# Patient Record
Sex: FEMALE | Race: WHITE | NOT HISPANIC OR LATINO | Employment: STUDENT | URBAN - METROPOLITAN AREA
[De-identification: names, ages, dates, MRNs, and addresses within clinical notes are randomized per-mention and may not be internally consistent; named-entity substitution may affect disease eponyms.]

---

## 2023-05-04 PROBLEM — R51.9 HEADACHE: Status: ACTIVE | Noted: 2023-05-04

## 2023-05-04 PROBLEM — F41.9 ANXIETY: Status: ACTIVE | Noted: 2023-05-04

## 2023-05-04 PROBLEM — J30.2 SEASONAL ALLERGIES: Status: ACTIVE | Noted: 2023-05-04

## 2023-05-04 RX ORDER — NAPROXEN 500 MG/1
500 TABLET ORAL EVERY 12 HOURS
COMMUNITY
Start: 2022-11-11 | End: 2023-05-05 | Stop reason: ALTCHOICE

## 2023-05-04 RX ORDER — DIPHENHYDRAMINE HCL 25 MG
TABLET ORAL DAILY PRN
COMMUNITY
Start: 2022-11-11 | End: 2023-05-05 | Stop reason: ALTCHOICE

## 2023-05-04 RX ORDER — METOCLOPRAMIDE 10 MG/1
TABLET ORAL DAILY PRN
COMMUNITY
Start: 2022-11-11 | End: 2023-05-05 | Stop reason: ALTCHOICE

## 2023-05-04 RX ORDER — BUSPIRONE HYDROCHLORIDE 5 MG/1
1 TABLET ORAL 2 TIMES DAILY
COMMUNITY
Start: 2022-11-23 | End: 2023-05-05 | Stop reason: ALTCHOICE

## 2023-05-05 ENCOUNTER — OFFICE VISIT (OUTPATIENT)
Dept: PRIMARY CARE | Facility: CLINIC | Age: 43
End: 2023-05-05
Payer: MEDICAID

## 2023-05-05 VITALS
DIASTOLIC BLOOD PRESSURE: 60 MMHG | HEART RATE: 80 BPM | SYSTOLIC BLOOD PRESSURE: 110 MMHG | BODY MASS INDEX: 22.19 KG/M2 | HEIGHT: 67 IN | WEIGHT: 141.4 LBS

## 2023-05-05 DIAGNOSIS — R10.2 PELVIC PAIN: ICD-10-CM

## 2023-05-05 DIAGNOSIS — R30.0 DYSURIA: Primary | ICD-10-CM

## 2023-05-05 LAB
APPEARANCE, URINE: CLEAR
BILIRUBIN, URINE: NEGATIVE
BLOOD, URINE: NEGATIVE
COLOR, URINE: COLORLESS
GLUCOSE, URINE: NEGATIVE MG/DL
KETONES, URINE: NEGATIVE MG/DL
LEUKOCYTE ESTERASE, URINE: NEGATIVE
NITRITE, URINE: NEGATIVE
PH, URINE: 7 (ref 5–8)
POC APPEARANCE, URINE: CLEAR
POC BILIRUBIN, URINE: NEGATIVE
POC BLOOD, URINE: ABNORMAL
POC COLOR, URINE: ABNORMAL
POC GLUCOSE, URINE: NEGATIVE MG/DL
POC KETONES, URINE: NEGATIVE MG/DL
POC LEUKOCYTES, URINE: NEGATIVE
POC NITRITE,URINE: NEGATIVE
POC PH, URINE: 6 PH
POC PROTEIN, URINE: NEGATIVE MG/DL
POC SPECIFIC GRAVITY, URINE: 1.01
POC UROBILINOGEN, URINE: 0.2 EU/DL
PROTEIN, URINE: NEGATIVE MG/DL
SPECIFIC GRAVITY, URINE: 1 (ref 1–1.03)
UROBILINOGEN, URINE: <2 MG/DL (ref 0–1.9)

## 2023-05-05 PROCEDURE — 99213 OFFICE O/P EST LOW 20 MIN: CPT | Performed by: STUDENT IN AN ORGANIZED HEALTH CARE EDUCATION/TRAINING PROGRAM

## 2023-05-05 PROCEDURE — 81003 URINALYSIS AUTO W/O SCOPE: CPT | Performed by: STUDENT IN AN ORGANIZED HEALTH CARE EDUCATION/TRAINING PROGRAM

## 2023-05-05 PROCEDURE — 81003 URINALYSIS AUTO W/O SCOPE: CPT

## 2023-05-05 PROCEDURE — 1036F TOBACCO NON-USER: CPT | Performed by: STUDENT IN AN ORGANIZED HEALTH CARE EDUCATION/TRAINING PROGRAM

## 2023-05-05 ASSESSMENT — ENCOUNTER SYMPTOMS
FEVER: 0
DYSPHORIC MOOD: 0
COUGH: 0
FREQUENCY: 1
PALPITATIONS: 0
SHORTNESS OF BREATH: 0
DYSURIA: 1
NERVOUS/ANXIOUS: 0
CHILLS: 0

## 2023-05-05 NOTE — PROGRESS NOTES
"Discuss urinary incontinence.    Subjective   Patient ID: Mandy Ku is a 42 y.o. female who presents for Urinary Incontinence.    HPI  State that she was jumping on a trampoline with her son earlier this week and was incontinent of urine. Felt a little off the following day, so started supplementing with cranberry juice in case she had a UTI. She does note a little urinary leakage with laughing on retrospect. Had 2 full term deliveries via c section. Does yoga regularly and has been trying to implement kegel exercises. Since last delivery multiple years ago, has had intermittent pelvic pain R>L. Denies abd pain, fever, flank pain. Has had mild burning with urination and frequency.    Review of Systems   Constitutional:  Negative for chills and fever.   Respiratory:  Negative for cough and shortness of breath.    Cardiovascular:  Negative for chest pain and palpitations.   Genitourinary:  Positive for dysuria and frequency.   Skin:  Negative for rash.   Psychiatric/Behavioral:  Negative for dysphoric mood. The patient is not nervous/anxious.      Objective   /60   Pulse 80   Ht 1.702 m (5' 7\")   Wt 64.1 kg (141 lb 6.4 oz)   BMI 22.15 kg/m²     Physical Exam  Constitutional:       Appearance: Normal appearance.   HENT:      Head: Normocephalic and atraumatic.   Eyes:      General: No scleral icterus.     Conjunctiva/sclera: Conjunctivae normal.   Cardiovascular:      Rate and Rhythm: Normal rate.   Pulmonary:      Effort: Pulmonary effort is normal. No respiratory distress.   Abdominal:      General: Abdomen is flat. There is no distension.      Palpations: Abdomen is soft. There is no mass.      Tenderness: There is abdominal tenderness (mild suprapubic). There is no guarding.   Musculoskeletal:      Cervical back: Normal range of motion and neck supple.   Skin:     General: Skin is warm and dry.      Findings: No rash.   Neurological:      General: No focal deficit present.      Mental Status: She " is alert.   Psychiatric:         Mood and Affect: Mood normal.         Behavior: Behavior normal.       Assessment/Plan   Problem List Items Addressed This Visit       Pelvic pain     R-sided. Intermittent for multiple years. Reviewed usual workup. Will start with TVUS and will follow up with results. Return precautions reviewed.         Relevant Orders    US pelvis transvaginal    Dysuria - Primary     Urinary frequency, mild burning, incontinence x1 over past few days. UA in office with trace blood - will send for micro and culture and follow up with results. Did discuss pelvic floor and likely relation to her current symptoms - she is agreeable to working on kegel exercises. Will continue to monitor. Return precautions reviewed.         Relevant Orders    POCT UA Automated manually resulted (Completed)    Urinalysis with Reflex Microscopic    Urine Culture

## 2023-05-05 NOTE — ASSESSMENT & PLAN NOTE
Urinary frequency, mild burning, incontinence x1 over past few days. UA in office with trace blood - will send for micro and culture and follow up with results. Did discuss pelvic floor and likely relation to her current symptoms - she is agreeable to working on kegel exercises. Will continue to monitor. Return precautions reviewed.

## 2023-05-05 NOTE — ASSESSMENT & PLAN NOTE
R-sided. Intermittent for multiple years. Reviewed usual workup. Will start with TVUS and will follow up with results. Return precautions reviewed.

## 2023-07-21 ENCOUNTER — TELEPHONE (OUTPATIENT)
Dept: PRIMARY CARE | Facility: CLINIC | Age: 43
End: 2023-07-21

## 2023-07-21 NOTE — TELEPHONE ENCOUNTER
Mandy needs a new order placed for her pelvic ultrasound it was cancelled in the system back in May.    She has decided to have it done now

## 2023-07-25 DIAGNOSIS — R10.2 PELVIC PAIN: Primary | ICD-10-CM

## 2023-09-06 ENCOUNTER — OFFICE VISIT (OUTPATIENT)
Dept: PRIMARY CARE | Facility: CLINIC | Age: 43
End: 2023-09-06
Payer: MEDICAID

## 2023-09-06 VITALS
WEIGHT: 136.8 LBS | HEART RATE: 80 BPM | SYSTOLIC BLOOD PRESSURE: 110 MMHG | HEIGHT: 67 IN | BODY MASS INDEX: 21.47 KG/M2 | DIASTOLIC BLOOD PRESSURE: 76 MMHG

## 2023-09-06 DIAGNOSIS — Z00.00 ROUTINE GENERAL MEDICAL EXAMINATION AT A HEALTH CARE FACILITY: Primary | ICD-10-CM

## 2023-09-06 DIAGNOSIS — R10.2 PELVIC PAIN: ICD-10-CM

## 2023-09-06 PROCEDURE — 99213 OFFICE O/P EST LOW 20 MIN: CPT | Performed by: STUDENT IN AN ORGANIZED HEALTH CARE EDUCATION/TRAINING PROGRAM

## 2023-09-06 PROCEDURE — 1036F TOBACCO NON-USER: CPT | Performed by: STUDENT IN AN ORGANIZED HEALTH CARE EDUCATION/TRAINING PROGRAM

## 2023-09-06 NOTE — PROGRESS NOTES
"Subjective   Patient ID: Mandy Ku is a 43 y.o. female who presents for physical exam    HPI  Pelvic pain - intermittent, worse with periods, does have appt with obgyn (Ann Marie Painting NP) next week to discuss    Otherwise doing well, still in school, working on exercise regimen, trying to cook more at home    Cervical Cancer Screening: thinks pap utd with obgyn  Breast Cancer Screening: no fhx, plan to get with obgyn  Osteoporosis Screening: plan to start at age 65  Colon Cancer Screening: no fhx, asymptomatic, plan to start at age 45  Tobacco: denies  Alcohol: denies  Recreational Drugs: denies  Immunizations: declines     Review of Systems   Constitutional:  Negative for chills and fever.   Respiratory:  Negative for cough and shortness of breath.    Cardiovascular:  Negative for chest pain and palpitations.   Skin:  Negative for rash.   Psychiatric/Behavioral:  Negative for dysphoric mood. The patient is not nervous/anxious.      Objective   /76   Pulse 80   Ht 1.702 m (5' 7\")   Wt 62.1 kg (136 lb 12.8 oz)   BMI 21.43 kg/m²     Physical Exam  Vitals reviewed.   Constitutional:       General: She is not in acute distress.     Appearance: Normal appearance.   HENT:      Head: Normocephalic and atraumatic.   Eyes:      General: No scleral icterus.     Conjunctiva/sclera: Conjunctivae normal.   Cardiovascular:      Rate and Rhythm: Normal rate and regular rhythm.      Heart sounds: No murmur heard.  Pulmonary:      Effort: Pulmonary effort is normal. No respiratory distress.      Breath sounds: Normal breath sounds.   Abdominal:      General: Bowel sounds are normal. There is no distension.      Palpations: Abdomen is soft.      Tenderness: There is no abdominal tenderness. There is no guarding or rebound.   Musculoskeletal:         General: No swelling or deformity.      Cervical back: Normal range of motion and neck supple.   Skin:     General: Skin is warm and dry.      Findings: No rash. "   Neurological:      General: No focal deficit present.      Mental Status: She is alert.   Psychiatric:         Mood and Affect: Mood normal.         Behavior: Behavior normal.       Assessment/Plan   Problem List Items Addressed This Visit       Pelvic pain     Intermittent. Reviewed tvus - slightly heterogenous myometrium. Has appt with obgyn next week to discuss tx options.          Other Visit Diagnoses       Routine general medical examination at a health care facility    -  Primary    Relevant Orders    CBC and Auto Differential    Comprehensive Metabolic Panel    Lipid Panel             Will obtain screening labs and will follow up with results. Encouraged breast ca screening with obgyn, which she plans to do. Follow up in 6mo for recheck, sooner if needed.

## 2023-09-07 ASSESSMENT — ENCOUNTER SYMPTOMS
FEVER: 0
CHILLS: 0
SHORTNESS OF BREATH: 0
DYSPHORIC MOOD: 0
NERVOUS/ANXIOUS: 0
COUGH: 0
PALPITATIONS: 0

## 2023-09-07 NOTE — ASSESSMENT & PLAN NOTE
Intermittent. Reviewed tvus - slightly heterogenous myometrium. Has appt with obgyn next week to discuss tx options.

## 2023-09-21 ENCOUNTER — LAB (OUTPATIENT)
Dept: LAB | Facility: LAB | Age: 43
End: 2023-09-21
Payer: MEDICAID

## 2023-09-21 ENCOUNTER — TELEPHONE (OUTPATIENT)
Dept: PRIMARY CARE | Facility: CLINIC | Age: 43
End: 2023-09-21

## 2023-09-21 DIAGNOSIS — Z00.00 ROUTINE GENERAL MEDICAL EXAMINATION AT A HEALTH CARE FACILITY: ICD-10-CM

## 2023-09-21 LAB
ALANINE AMINOTRANSFERASE (SGPT) (U/L) IN SER/PLAS: 13 U/L (ref 7–45)
ALBUMIN (G/DL) IN SER/PLAS: 4.5 G/DL (ref 3.4–5)
ALKALINE PHOSPHATASE (U/L) IN SER/PLAS: 62 U/L (ref 33–110)
ANION GAP IN SER/PLAS: 10 MMOL/L (ref 10–20)
ASPARTATE AMINOTRANSFERASE (SGOT) (U/L) IN SER/PLAS: 15 U/L (ref 9–39)
BASOPHILS (10*3/UL) IN BLOOD BY AUTOMATED COUNT: 0.05 X10E9/L (ref 0–0.1)
BASOPHILS/100 LEUKOCYTES IN BLOOD BY AUTOMATED COUNT: 1 % (ref 0–2)
BILIRUBIN TOTAL (MG/DL) IN SER/PLAS: 0.8 MG/DL (ref 0–1.2)
CALCIUM (MG/DL) IN SER/PLAS: 9.2 MG/DL (ref 8.6–10.3)
CARBON DIOXIDE, TOTAL (MMOL/L) IN SER/PLAS: 27 MMOL/L (ref 21–32)
CHLORIDE (MMOL/L) IN SER/PLAS: 105 MMOL/L (ref 98–107)
CHOLESTEROL (MG/DL) IN SER/PLAS: 145 MG/DL (ref 0–199)
CHOLESTEROL IN HDL (MG/DL) IN SER/PLAS: 52 MG/DL
CHOLESTEROL/HDL RATIO: 2.8
CREATININE (MG/DL) IN SER/PLAS: 0.78 MG/DL (ref 0.5–1.05)
EOSINOPHILS (10*3/UL) IN BLOOD BY AUTOMATED COUNT: 0.15 X10E9/L (ref 0–0.7)
EOSINOPHILS/100 LEUKOCYTES IN BLOOD BY AUTOMATED COUNT: 3 % (ref 0–6)
ERYTHROCYTE DISTRIBUTION WIDTH (RATIO) BY AUTOMATED COUNT: 12.4 % (ref 11.5–14.5)
ERYTHROCYTE MEAN CORPUSCULAR HEMOGLOBIN CONCENTRATION (G/DL) BY AUTOMATED: 32.5 G/DL (ref 32–36)
ERYTHROCYTE MEAN CORPUSCULAR VOLUME (FL) BY AUTOMATED COUNT: 95 FL (ref 80–100)
ERYTHROCYTES (10*6/UL) IN BLOOD BY AUTOMATED COUNT: 4.13 X10E12/L (ref 4–5.2)
GFR FEMALE: >90 ML/MIN/1.73M2
GLUCOSE (MG/DL) IN SER/PLAS: 96 MG/DL (ref 74–99)
HEMATOCRIT (%) IN BLOOD BY AUTOMATED COUNT: 39.1 % (ref 36–46)
HEMOGLOBIN (G/DL) IN BLOOD: 12.7 G/DL (ref 12–16)
IMMATURE GRANULOCYTES/100 LEUKOCYTES IN BLOOD BY AUTOMATED COUNT: 0 % (ref 0–0.9)
LDL: 70 MG/DL (ref 0–99)
LEUKOCYTES (10*3/UL) IN BLOOD BY AUTOMATED COUNT: 5 X10E9/L (ref 4.4–11.3)
LYMPHOCYTES (10*3/UL) IN BLOOD BY AUTOMATED COUNT: 1.86 X10E9/L (ref 1.2–4.8)
LYMPHOCYTES/100 LEUKOCYTES IN BLOOD BY AUTOMATED COUNT: 37.1 % (ref 13–44)
MONOCYTES (10*3/UL) IN BLOOD BY AUTOMATED COUNT: 0.31 X10E9/L (ref 0.1–1)
MONOCYTES/100 LEUKOCYTES IN BLOOD BY AUTOMATED COUNT: 6.2 % (ref 2–10)
NEUTROPHILS (10*3/UL) IN BLOOD BY AUTOMATED COUNT: 2.64 X10E9/L (ref 1.2–7.7)
NEUTROPHILS/100 LEUKOCYTES IN BLOOD BY AUTOMATED COUNT: 52.7 % (ref 40–80)
PLATELETS (10*3/UL) IN BLOOD AUTOMATED COUNT: 225 X10E9/L (ref 150–450)
POTASSIUM (MMOL/L) IN SER/PLAS: 4.1 MMOL/L (ref 3.5–5.3)
PROTEIN TOTAL: 7.1 G/DL (ref 6.4–8.2)
SODIUM (MMOL/L) IN SER/PLAS: 138 MMOL/L (ref 136–145)
TRIGLYCERIDE (MG/DL) IN SER/PLAS: 113 MG/DL (ref 0–149)
UREA NITROGEN (MG/DL) IN SER/PLAS: 7 MG/DL (ref 6–23)
VLDL: 23 MG/DL (ref 0–40)

## 2023-09-21 PROCEDURE — 80053 COMPREHEN METABOLIC PANEL: CPT

## 2023-09-21 PROCEDURE — 80061 LIPID PANEL: CPT

## 2023-09-21 PROCEDURE — 36415 COLL VENOUS BLD VENIPUNCTURE: CPT

## 2023-09-21 PROCEDURE — 85025 COMPLETE CBC W/AUTO DIFF WBC: CPT

## 2023-09-21 NOTE — TELEPHONE ENCOUNTER
She called and said she has been getting more frequent migraines. Wondering if they could be due to allergies? Asking about allergy testing.

## 2023-09-22 ENCOUNTER — TELEPHONE (OUTPATIENT)
Dept: PRIMARY CARE | Facility: CLINIC | Age: 43
End: 2023-09-22
Payer: MEDICAID

## 2023-09-22 DIAGNOSIS — J30.2 SEASONAL ALLERGIES: Primary | ICD-10-CM

## 2023-09-22 NOTE — TELEPHONE ENCOUNTER
Chelsea Aguilar,   [address] [place] [id number] Clinical Support Staff  Please let patient know that all of her labs (cholesterol, glucose, electrolytes, kidneys, liver, blood counts) look great and are in normal range. Nothing to follow up on. Thank you

## 2024-10-18 ENCOUNTER — OFFICE VISIT (OUTPATIENT)
Dept: URGENT CARE | Facility: CLINIC | Age: 44
End: 2024-10-18
Payer: MEDICAID

## 2024-10-18 VITALS
HEART RATE: 87 BPM | RESPIRATION RATE: 16 BRPM | OXYGEN SATURATION: 99 % | WEIGHT: 135 LBS | SYSTOLIC BLOOD PRESSURE: 104 MMHG | BODY MASS INDEX: 21.19 KG/M2 | DIASTOLIC BLOOD PRESSURE: 72 MMHG | HEIGHT: 67 IN

## 2024-10-18 DIAGNOSIS — N30.01 ACUTE CYSTITIS WITH HEMATURIA: Primary | ICD-10-CM

## 2024-10-18 DIAGNOSIS — R30.0 DYSURIA: ICD-10-CM

## 2024-10-18 LAB
POC APPEARANCE, URINE: ABNORMAL
POC BILIRUBIN, URINE: NEGATIVE
POC BLOOD, URINE: ABNORMAL
POC COLOR, URINE: YELLOW
POC GLUCOSE, URINE: NEGATIVE MG/DL
POC KETONES, URINE: NEGATIVE MG/DL
POC LEUKOCYTES, URINE: ABNORMAL
POC NITRITE,URINE: NEGATIVE
POC PH, URINE: 6 PH
POC PROTEIN, URINE: ABNORMAL MG/DL
POC SPECIFIC GRAVITY, URINE: 1.02
POC UROBILINOGEN, URINE: 0.2 EU/DL

## 2024-10-18 PROCEDURE — 81002 URINALYSIS NONAUTO W/O SCOPE: CPT | Performed by: PHYSICIAN ASSISTANT

## 2024-10-18 PROCEDURE — 87086 URINE CULTURE/COLONY COUNT: CPT

## 2024-10-18 PROCEDURE — 99212 OFFICE O/P EST SF 10 MIN: CPT | Performed by: PHYSICIAN ASSISTANT

## 2024-10-18 RX ORDER — SULFAMETHOXAZOLE AND TRIMETHOPRIM 800; 160 MG/1; MG/1
1 TABLET ORAL 2 TIMES DAILY
Qty: 14 TABLET | Refills: 0 | Status: SHIPPED | OUTPATIENT
Start: 2024-10-18 | End: 2024-10-25

## 2024-10-18 NOTE — PROGRESS NOTES
[facility] URGENT CARE   REGAN NOTE:      Name: Mandy Ku, 44 y.o.    CSN:[id number]   MRN:[id number]    PCP: Chelsea Aguilar, DO    ALL:  No Known Allergies    History:    Chief Complaint: UTI (Discomfort while urinating sx for 4 days. )    Encounter Date: 10/18/2024      HPI: The history was obtained from the patient and her two sons . Mandy is a 44 y.o. female, who presents with a chief complaint of UTI (Discomfort while urinating sx for 4 days. ) denies any flank pain, N/V or fever.  Has a history of a  states the second 1 she feels often causes some aching and pain especially when palpating the region and now that she is got the bladder concerns it is worse.    PMHx:    No past medical history on file.           Current Outpatient Medications   Medication Sig Dispense Refill    sulfamethoxazole-trimethoprim (Bactrim DS) 800-160 mg tablet Take 1 tablet by mouth 2 times a day for 7 days. 14 tablet 0     No current facility-administered medications for this visit.         PMSx:    Past Surgical History:   Procedure Laterality Date    OTHER SURGICAL HISTORY  2022     section       Fam Hx:   Family History   Problem Relation Name Age of Onset    Hypertension Mother      Diabetes Mother      Heart attack Mother         SOC. Hx:     Social History     Socioeconomic History    Marital status: Single     Spouse name: Not on file    Number of children: Not on file    Years of education: Not on file    Highest education level: Not on file   Occupational History    Not on file   Tobacco Use    Smoking status: Never    Smokeless tobacco: Never   Substance and Sexual Activity    Alcohol use: Not on file    Drug use: Not on file    Sexual activity: Not on file   Other Topics Concern    Not on file   Social History Narrative    Not on file     Social Drivers of Health     Financial Resource Strain: Not on file   Food Insecurity: Not on file   Transportation Needs: Not on  file   Physical Activity: Not on file   Stress: Not on file   Social Connections: Not on file   Intimate Partner Violence: Not on file   Housing Stability: Not on file         Vitals:    10/18/24 1016   BP: 104/72   Pulse: 87   Resp: 16   SpO2: 99%     61.2 kg (135 lb)          Physical Exam  Vitals reviewed.   Constitutional:       Appearance: She is normal weight.   HENT:      Head: Normocephalic and atraumatic.   Eyes:      Extraocular Movements: Extraocular movements intact.      Pupils: Pupils are equal, round, and reactive to light.   Pulmonary:      Effort: Pulmonary effort is normal.   Abdominal:      General: Abdomen is flat.      Tenderness: There is abdominal tenderness (Suprapubic). There is no right CVA tenderness or left CVA tenderness.   Musculoskeletal:         General: Normal range of motion.   Skin:     General: Skin is warm.      Capillary Refill: Capillary refill takes less than 2 seconds.   Neurological:      General: No focal deficit present.      Mental Status: She is alert.   Psychiatric:         Mood and Affect: Mood normal.         LABORATORY @ RADIOLOGICAL IMAGING (if done):     Results for orders placed or performed in visit on 10/18/24 (from the past 24 hours)   POCT UA (nonautomated w/o microscopy) manually resulted   Result Value Ref Range    POC Color, Urine Yellow Straw, Yellow, Light-Yellow    POC Appearance, Urine Cloudy (A) Clear    POC Glucose, Urine NEGATIVE NEGATIVE mg/dl    POC Bilirubin, Urine NEGATIVE NEGATIVE    POC Ketones, Urine NEGATIVE NEGATIVE mg/dl    POC Specific Gravity, Urine 1.020 1.005 - 1.035    POC Blood, Urine LARGE (3+) (A) NEGATIVE    POC PH, Urine 6.0 No Reference Range Established PH    POC Protein, Urine 100 (2+) (A) NEGATIVE, 30 (1+) mg/dl    POC Urobilinogen, Urine 0.2 0.2, 1.0 EU/DL    Poc Nitrite, Urine NEGATIVE NEGATIVE    POC Leukocytes, Urine LARGE (3+) (A) NEGATIVE       ____________________________________________________________________    I did  personally review Mandy's past medical history, surgical history, social history, as well as family history (when relevant).  In this case, I also oversaw the her drug management by reviewing her medication list, allergy list, as well as the medications that I prescribed during the UC course and/or recommended as an out-patient (including possible OTC medications such as acetaminophen, NSAIDs , etc).    After reviewing the items above, I did look at previous medical documentation, such as recent hospitalizations, office visits, and/or recent consultations with PCP/specialist.                          SDOH:   Another factor that I considered in Mandy's care was her Social Determinants of Health (SDOH). During this UC encounter, she did not have social determinants of health. Those SDOH influencing Mandy's care are: none      _____________________________________________________________________      UC COURSE/MEDICAL DECISION MAKING:    Mandy is a 44 y.o., who presents with a working diagnosis of   1. Acute cystitis with hematuria    2. Dysuria      Current plan is to treat with Bactrim DS, will send for urine culture, will notify results when available encouraged to push fluids.  She was agreeable discharge        Michele Linares PA-C   Advanced Practice Provider  [facility] URGENT CARE

## 2024-10-20 LAB — BACTERIA UR CULT: NO GROWTH

## 2024-10-22 ENCOUNTER — TELEPHONE (OUTPATIENT)
Dept: URGENT CARE | Facility: CLINIC | Age: 44
End: 2024-10-22
Payer: MEDICAID

## 2024-10-22 NOTE — TELEPHONE ENCOUNTER
----- Message from Michele Linares sent at 10/22/2024 11:08 AM EDT -----  Please call the patient regarding her normal result. She can discontinue the antibiotic.    Patient returned phone call regarding her lab results, notified patient of normal culture results and to discontinue antibiotic.

## 2025-05-15 ENCOUNTER — APPOINTMENT (OUTPATIENT)
Dept: PRIMARY CARE | Facility: CLINIC | Age: 45
End: 2025-05-15
Payer: MEDICAID

## 2025-05-15 VITALS
BODY MASS INDEX: 22.7 KG/M2 | HEIGHT: 67 IN | WEIGHT: 144.6 LBS | SYSTOLIC BLOOD PRESSURE: 104 MMHG | OXYGEN SATURATION: 96 % | HEART RATE: 81 BPM | DIASTOLIC BLOOD PRESSURE: 70 MMHG

## 2025-05-15 DIAGNOSIS — Z00.00 ROUTINE GENERAL MEDICAL EXAMINATION AT A HEALTH CARE FACILITY: Primary | ICD-10-CM

## 2025-05-15 PROBLEM — R30.0 DYSURIA: Status: RESOLVED | Noted: 2023-05-05 | Resolved: 2025-05-15

## 2025-05-15 PROBLEM — R10.2 PELVIC PAIN: Status: RESOLVED | Noted: 2023-05-05 | Resolved: 2025-05-15

## 2025-05-15 PROBLEM — R51.9 HEADACHE: Status: RESOLVED | Noted: 2023-05-04 | Resolved: 2025-05-15

## 2025-05-15 PROCEDURE — 1036F TOBACCO NON-USER: CPT | Performed by: STUDENT IN AN ORGANIZED HEALTH CARE EDUCATION/TRAINING PROGRAM

## 2025-05-15 PROCEDURE — 3008F BODY MASS INDEX DOCD: CPT | Performed by: STUDENT IN AN ORGANIZED HEALTH CARE EDUCATION/TRAINING PROGRAM

## 2025-05-15 PROCEDURE — 99396 PREV VISIT EST AGE 40-64: CPT | Performed by: STUDENT IN AN ORGANIZED HEALTH CARE EDUCATION/TRAINING PROGRAM

## 2025-05-15 ASSESSMENT — ENCOUNTER SYMPTOMS
CONSTIPATION: 0
DYSPHORIC MOOD: 0
COUGH: 0
SHORTNESS OF BREATH: 0
FEVER: 0
ABDOMINAL PAIN: 0
PALPITATIONS: 0
NAUSEA: 0
VOMITING: 0
CHILLS: 0
DYSURIA: 0
NERVOUS/ANXIOUS: 0
DIARRHEA: 0

## 2025-05-15 NOTE — PROGRESS NOTES
"Subjective   Patient ID: Mandy Ku is a 44 y.o. female who presents for yearly check up.    HPI  Pelvic pain - states that her pain has resolved, she feels that the pain she was experiencing was related to stress, she is now living in her own place with her 2 sons and is planning to move to [place] to live with her boyfriend, planning to get  and have another baby within the next 6 months, she recently meet with her obgyn for preconception counseling    Cervical Cancer Screening: states utd with obgyn  Breast Cancer Screening: no fhx, she declines mammogram at this time but will consider  Osteoporosis Screening: plan to start at age 65  Colon Cancer Screening: no fhx, asymptomatic, plan to start at age 45 (will be turning 44yo in a few week), she declines at this time but will consider  Tobacco: denies  Alcohol: denies  Recreational Drugs: denies  Immunizations: she declines     Review of Systems   Constitutional:  Negative for chills and fever.   Respiratory:  Negative for cough and shortness of breath.    Cardiovascular:  Negative for chest pain and palpitations.   Gastrointestinal:  Negative for abdominal pain, constipation, diarrhea, nausea and vomiting.   Genitourinary:  Negative for dysuria.   Skin:  Negative for rash.   Psychiatric/Behavioral:  Negative for dysphoric mood. The patient is not nervous/anxious.      Objective   /70   Pulse 81   Ht 1.702 m (5' 7\")   Wt 65.6 kg (144 lb 9.6 oz)   SpO2 96%   BMI 22.65 kg/m²     Physical Exam  Vitals reviewed.   Constitutional:       General: She is not in acute distress.     Appearance: Normal appearance.   HENT:      Head: Normocephalic and atraumatic.   Eyes:      General: No scleral icterus.     Conjunctiva/sclera: Conjunctivae normal.   Cardiovascular:      Rate and Rhythm: Normal rate and regular rhythm.      Heart sounds: No murmur heard.  Pulmonary:      Effort: Pulmonary effort is normal. No respiratory distress.      Breath " sounds: Normal breath sounds.   Abdominal:      General: Bowel sounds are normal. There is no distension.      Palpations: Abdomen is soft.      Tenderness: There is no abdominal tenderness. There is no guarding or rebound.   Musculoskeletal:         General: No swelling or deformity.      Cervical back: Normal range of motion and neck supple.   Skin:     General: Skin is warm and dry.      Findings: No rash.   Neurological:      General: No focal deficit present.      Mental Status: She is alert.   Psychiatric:         Mood and Affect: Mood normal.         Behavior: Behavior normal.       Assessment/Plan   Problem List Items Addressed This Visit           ICD-10-CM    Routine general medical examination at a health care facility - [facility] Z00.00   Feeling well and much improved today. She is looking forward to her future and is excited for her move. She declines routine screening labs as well as recommended cancer screenings at this time. We will hold on scheduling our next annual checkup due to her planning to move in the next 6mo. She was instructed to reach out in the meantime if she needs anything or has any concerns.

## 2025-05-31 ENCOUNTER — OFFICE VISIT (OUTPATIENT)
Dept: URGENT CARE | Facility: CLINIC | Age: 45
End: 2025-05-31
Payer: MEDICAID

## 2025-05-31 VITALS
HEIGHT: 67 IN | WEIGHT: 144 LBS | DIASTOLIC BLOOD PRESSURE: 66 MMHG | RESPIRATION RATE: 16 BRPM | HEART RATE: 72 BPM | TEMPERATURE: 97.5 F | OXYGEN SATURATION: 97 % | SYSTOLIC BLOOD PRESSURE: 118 MMHG | BODY MASS INDEX: 22.6 KG/M2

## 2025-05-31 DIAGNOSIS — M25.561 RIGHT KNEE PAIN, UNSPECIFIED CHRONICITY: ICD-10-CM

## 2025-05-31 DIAGNOSIS — L98.9 SKIN LESION OF BACK: ICD-10-CM

## 2025-05-31 DIAGNOSIS — S90.851A SPLINTER OF RIGHT FOOT WITHOUT INFECTION, INITIAL ENCOUNTER: Primary | ICD-10-CM

## 2025-05-31 PROCEDURE — 99213 OFFICE O/P EST LOW 20 MIN: CPT | Performed by: PHYSICIAN ASSISTANT

## 2025-05-31 PROCEDURE — 10120 INC&RMVL FB SUBQ TISS SMPL: CPT | Performed by: PHYSICIAN ASSISTANT

## 2025-05-31 NOTE — PROGRESS NOTES
[facility] URGENT CARE   REGAN NOTE:      Name: Mandy Ku, 44 y.o.    CSN:[id number]   MRN:[id number]    PCP: Chelsea Aguilar, DO    ALL:  Allergies[1]    History:    Chief Complaint: spinter (Pt state splinter in the right foot.)    Encounter Date: 5/31/2025  10:50    At the beginning of the encounter, I introduced myself to the patient as a Physician Assistant in the urgent care setting, ensuring they understood my role in their care and establishing rapport.      HPI: The history was obtained from the patient. Mandy is a 44 y.o. female, who presents with a chief complaint of spinter (Pt state splinter in the right foot.) she also has two additional complaints of right knee pain ongoing ever since doing a triangle pose in yoga and she is having a concern for the mid scapular thoracic skin lesion last seen for here 6/27/2023.    1) the splinter in her right lateral foot is involving a callus, it has been present after walking on a wooden surface/porch 5 weeks ago, causing pain and discomfort affecting the right lateral foot now    2) after performing a triangle pose in yoga she has felt some right gluteal pain with discomfort in the right knee, says that she has been using an Ace wrap of sorts to bind her knee during this stretching activity, she is got slight improvement with continued stretching and time.    3) she is also had a skin lesion affecting the mid thoracic region ongoing for years, initially I told her it appeared to be a support keratoses; however, she now appears to have a small 2 to 3 mm black asymmetric lesion adjacent to right spine could be a freckle but I would feel better if she saw dermatologist regarding this finding.    PMHx:    Medical History[2]         Current Medications[3]      PMSx:  Surgical History[4]    Fam Hx: Family History[5]    SOC. Hx:     Social History     Socioeconomic History    Marital status: Single     Spouse name: Not on file    Number  of children: Not on file    Years of education: Not on file    Highest education level: Not on file   Occupational History    Not on file   Tobacco Use    Smoking status: Never    Smokeless tobacco: Never   Substance and Sexual Activity    Alcohol use: Not on file    Drug use: Not on file    Sexual activity: Not on file   Other Topics Concern    Not on file   Social History Narrative    Not on file     Social Drivers of Health     Financial Resource Strain: Not on file   Food Insecurity: Not on file   Transportation Needs: No Transportation Needs (5/7/2025)    Received from [facility]     Lack of Transportation (Medical): No     Lack of Transportation (Non-Medical): No   Physical Activity: Not on file   Stress: Not on file   Social Connections: Not on file   Intimate Partner Violence: Not on file   Housing Stability: Not on file         Vitals:    05/31/25 1043   BP: 118/66   Pulse: 72   Resp: 16   Temp: 36.4 °C (97.5 °F)   SpO2: 97%     65.3 kg (144 lb)          Physical Exam  Vitals reviewed.   Constitutional:       Appearance: She is normal weight.   HENT:      Head: Normocephalic and atraumatic.   Eyes:      Extraocular Movements: Extraocular movements intact.      Pupils: Pupils are equal, round, and reactive to light.   Cardiovascular:      Rate and Rhythm: Normal rate.   Pulmonary:      Effort: Pulmonary effort is normal.   Musculoskeletal:         General: Normal range of motion.      Cervical back: Normal range of motion.   Skin:     General: Skin is warm.      Capillary Refill: Capillary refill takes less than 2 seconds.      Findings: Lesion (Support keratoses left paraspinal region appears to be 5 mm in size and flattopped, right paraspinal region mid scapular area notes 2 to 3 mm asymmetric black macule) present.      Comments: Callus formation noted to most of the pressure points of foot bilaterally, she does have a callus affecting the region of complaint, this seems to  be some punctate region within this area that might cause the pain and would prompt a foreign body retrieval given her complaints of being symptomatic   Neurological:      General: No focal deficit present.      Mental Status: She is alert.           Patient ID: Mandy Ku is a 44 y.o. female.    Procedures  PROCEDURE:   splinter removal                                                                                        After the area was anesthetized, I prepped the area with alcohol swab and then with a sterile drape.  She did not want anesthetics to be provided given a previous experience with her father was quite tolerable with just probing.  3    Under magnification with otoscope, then utilizing an blunt fill needle 18-gauge, I carefully dissected down to where the FB was located.  I used alligator forceps to remove the FB in its entirety.  The rest of the wound, and wound base were explored, and no other obvious FBs could be appreciated.  I then covered with a dressing. She tolerated the procedure well.                  I did explain the concern for infection to Mandy, and to return to our ED immediately should she have any concerns.  I also explained that sometimes FBs can remain, and cannot always be visualized.  Finally, I recommended a wound check in about 2-3 days (or sooner, if any worsening signs or symptoms).     ____________________________________________________________________    I did personally review Mandy's past medical history, surgical history, social history, as well as family history (when relevant).  In this case, I also oversaw the her drug management by reviewing her medication list, allergy list, as well as the medications that I prescribed during the UC course and/or recommended as an out-patient (including possible OTC medications such as acetaminophen, NSAIDs , etc).    After reviewing the items above, I did not look at previous medical documentation, such as recent  hospitalizations, office visits, and/or recent consultations with PCP/specialist.                          SDOH:   Another factor that I considered in Mandy's care was her Social Determinants of Health (SDOH). During this  encounter, she did not have social determinants of health. Those SDOH influencing Mandy's care are: none      _____________________________________________________________________       COURSE/MEDICAL DECISION MAKING:    Mandy is a 44 y.o., who presents with a working diagnosis of   1. Splinter of right foot without infection, initial encounter    2. Skin lesion of back    3. Right knee pain, unspecified chronicity      splinter removed, she tolerated the procedure well, discussed wound care and when to seek reevaluation.  concerned for the lesion on the left paraspinal area, recommend she see dermatology for further eval and possible biopsy  the knee pain does seem to be tolerable, provided her with a 4 inch Ace wrap, discussed continue stretching activities as tolerated and to call if symptoms persist.  Would be partial IT band syndrome versus sciatic nerve irritation        Michele Linares PA-C   Advanced Practice Provider  [facility] URGENT CARE    Please note: While the patient may or may not have received printed discharge paperwork, all relevant medical findings, test results, and treatment details are accessible through the electronic medical record system. The patient is encouraged to review their chart via the patient portal for comprehensive information and follow-up instructions.         [1] No Known Allergies  [2] No past medical history on file.  [3]   No current outpatient medications on file.     No current facility-administered medications for this visit.   [4]   Past Surgical History:  Procedure Laterality Date    OTHER SURGICAL HISTORY  2022     section   [5]   Family History  Problem Relation Name Age of Onset     Hypertension Mother      Diabetes Mother      Heart attack Mother

## 2025-08-01 ENCOUNTER — OFFICE VISIT (OUTPATIENT)
Dept: URGENT CARE | Facility: CLINIC | Age: 45
End: 2025-08-01
Payer: MEDICAID

## 2025-08-01 VITALS
BODY MASS INDEX: 22.6 KG/M2 | HEIGHT: 67 IN | OXYGEN SATURATION: 97 % | HEART RATE: 74 BPM | RESPIRATION RATE: 16 BRPM | SYSTOLIC BLOOD PRESSURE: 109 MMHG | TEMPERATURE: 97.8 F | DIASTOLIC BLOOD PRESSURE: 79 MMHG | WEIGHT: 144 LBS

## 2025-08-01 DIAGNOSIS — H61.20 CERUMEN IN AUDITORY CANAL ON EXAMINATION: ICD-10-CM

## 2025-08-01 DIAGNOSIS — S00.412A EXCORIATION OF LEFT EAR CANAL, INITIAL ENCOUNTER: Primary | ICD-10-CM

## 2025-08-01 PROCEDURE — 99211 OFF/OP EST MAY X REQ PHY/QHP: CPT | Performed by: PHYSICIAN ASSISTANT

## 2025-08-01 PROCEDURE — 69210 REMOVE IMPACTED EAR WAX UNI: CPT | Performed by: PHYSICIAN ASSISTANT

## 2025-08-01 NOTE — PROGRESS NOTES
[facility] URGENT CARE   REGAN NOTE:      Name: Mandy Ku, 45 y.o.    CSN:[id number]   MRN:[id number]    PCP: Chelsea Aguilar, DO    ALL:  Allergies[1]    History:    Chief Complaint: Ear Fullness (Fullness in left ear. )    Encounter Date: 8/1/2025  12:15    At the beginning of the encounter, I introduced myself to the patient as a Physician Assistant in the urgent care setting, ensuring they understood my role in their care and establishing rapport.      HPI: The history was obtained from the patient. Mandy is a 45 y.o. female, who presents with a chief complaint of Ear Fullness (Fullness in left ear. ) she is unaware if there is any cerumen impaction or swelling of the ear canal but it feels like there is something there causing a hearing impedance.    PMHx:    Medical History[2]         Current Medications[3]      PMSx:  Surgical History[4]    Fam Hx: Family History[5]    SOC. Hx:     Social History     Socioeconomic History    Marital status: Single     Spouse name: Not on file    Number of children: Not on file    Years of education: Not on file    Highest education level: Not on file   Occupational History    Not on file   Tobacco Use    Smoking status: Never    Smokeless tobacco: Never   Substance and Sexual Activity    Alcohol use: Not on file    Drug use: Not on file    Sexual activity: Not on file   Other Topics Concern    Not on file   Social History Narrative    Not on file     Social Drivers of Health     Financial Resource Strain: Not on file   Food Insecurity: Not on file   Transportation Needs: No Transportation Needs (5/7/2025)    Received from [facility]     Lack of Transportation (Medical): No     Lack of Transportation (Non-Medical): No   Physical Activity: Not on file   Stress: Not on file   Social Connections: Not on file   Intimate Partner Violence: Not on file   Housing Stability: Not on file         Vitals:    08/01/25 1149    BP: 109/79   Pulse: 74   Resp: 16   Temp: 36.6 °C (97.8 °F)   SpO2: 97%     65.3 kg (144 lb)          Physical Exam  Vitals reviewed.   Constitutional:       Appearance: Normal appearance.   HENT:      Head: Normocephalic and atraumatic.      Left Ear: No decreased hearing noted. Tenderness present. No laceration or drainage.  No middle ear effusion. There is no impacted cerumen. No foreign body. No mastoid tenderness. No PE tube. No hemotympanum. Tympanic membrane is not injected, scarred, perforated, erythematous, retracted or bulging. Tympanic membrane has normal mobility.      Ears:      Comments: The slightest amount of cerumen is noted proximal ear canal    Eyes:      Extraocular Movements: Extraocular movements intact.      Pupils: Pupils are equal, round, and reactive to light.     Pulmonary:      Effort: Pulmonary effort is normal.     Musculoskeletal:      Cervical back: Normal range of motion.   Lymphadenopathy:      Head:      Right side of head: No preauricular or posterior auricular adenopathy.      Left side of head: No preauricular or posterior auricular adenopathy.     Skin:     General: Skin is warm and dry.      Capillary Refill: Capillary refill takes less than 2 seconds.      Findings: No rash.     Neurological:      Mental Status: She is alert.         Patient ID: Mandy Ku is a 45 y.o. female.    Procedures  Procedure: cerumen removal     Patient had a small amount of cerumen in her left ear canal(s). Using flushing and currettage, I carefully removed as much cerumen as I could.  There was no TM perforation, and no bleeding, and she tolerated the procedure without difficulty.   ____________________________________________________________________    I did personally review Mandy's past medical history, surgical history, social history, as well as family history (when relevant).  In this case, I also oversaw the her drug management by reviewing her medication list, allergy list, as  well as the medications that I prescribed during the UC course and/or recommended as an out-patient (including possible OTC medications such as acetaminophen, NSAIDs , etc).    After reviewing the items above, I did look at previous medical documentation, such as recent hospitalizations, office visits, and/or recent consultations with PCP/specialist.                          SDOH:   Another factor that I considered in Mandy's care was her Social Determinants of Health (SDOH). During this UC encounter, she did not have social determinants of health. Those SDOH influencing Mandy's care are: none      _____________________________________________________________________      UC COURSE/MEDICAL DECISION MAKING:    Mandy is a 45 y.o., who presents with a working diagnosis of   1. Excoriation of left ear canal, initial encounter    2. Cerumen in auditory canal on examination      Reassured patient, today may be an excoriation to this 12 o'clock position of the left proximal ear canal, this cerumen was removed with irrigation and ear curette, she tolerated well, no complications no otorrhea or bleeding noted.        Michele Linares PA-C   Advanced Practice Provider  [facility] URGENT CARE    Please note: While the patient may or may not have received printed discharge paperwork, all relevant medical findings, test results, and treatment details are accessible through the electronic medical record system. The patient is encouraged to review their chart via the patient portal for comprehensive information and follow-up instructions.         [1] No Known Allergies  [2] No past medical history on file.  [3]   No current outpatient medications on file.     No current facility-administered medications for this visit.   [4]   Past Surgical History:  Procedure Laterality Date    OTHER SURGICAL HISTORY  2022     section   [5]   Family History  Problem Relation Name Age of Onset     Hypertension Mother      Diabetes Mother      Heart attack Mother

## 2025-08-05 ENCOUNTER — PATIENT OUTREACH (OUTPATIENT)
Dept: CARE COORDINATION | Facility: CLINIC | Age: 45
End: 2025-08-05
Payer: MEDICAID

## 2025-08-05 DIAGNOSIS — Z12.31 ENCOUNTER FOR SCREENING MAMMOGRAM FOR BREAST CANCER: ICD-10-CM

## 2025-08-22 ENCOUNTER — APPOINTMENT (OUTPATIENT)
Dept: PRIMARY CARE | Facility: CLINIC | Age: 45
End: 2025-08-22
Payer: MEDICAID

## 2025-08-22 VITALS
BODY MASS INDEX: 22.29 KG/M2 | DIASTOLIC BLOOD PRESSURE: 76 MMHG | WEIGHT: 142 LBS | HEIGHT: 67 IN | SYSTOLIC BLOOD PRESSURE: 110 MMHG | HEART RATE: 76 BPM

## 2025-08-22 DIAGNOSIS — D22.9 MULTIPLE NEVI: Primary | ICD-10-CM

## 2025-08-22 DIAGNOSIS — J30.2 SEASONAL ALLERGIES: ICD-10-CM

## 2025-08-22 PROCEDURE — 99213 OFFICE O/P EST LOW 20 MIN: CPT | Performed by: STUDENT IN AN ORGANIZED HEALTH CARE EDUCATION/TRAINING PROGRAM

## 2025-08-22 PROCEDURE — 3008F BODY MASS INDEX DOCD: CPT | Performed by: STUDENT IN AN ORGANIZED HEALTH CARE EDUCATION/TRAINING PROGRAM

## 2025-08-24 PROBLEM — D22.9 MULTIPLE NEVI: Status: ACTIVE | Noted: 2025-08-24

## 2025-08-24 ASSESSMENT — ENCOUNTER SYMPTOMS
FEVER: 0
PALPITATIONS: 0
COUGH: 0
DYSPHORIC MOOD: 0
CHILLS: 0
SHORTNESS OF BREATH: 0
NERVOUS/ANXIOUS: 0

## 2026-02-23 ENCOUNTER — APPOINTMENT (OUTPATIENT)
Dept: PRIMARY CARE | Facility: CLINIC | Age: 46
End: 2026-02-23
Payer: MEDICAID